# Patient Record
Sex: MALE | Race: OTHER | NOT HISPANIC OR LATINO | URBAN - METROPOLITAN AREA
[De-identification: names, ages, dates, MRNs, and addresses within clinical notes are randomized per-mention and may not be internally consistent; named-entity substitution may affect disease eponyms.]

---

## 2019-11-19 ENCOUNTER — EMERGENCY (EMERGENCY)
Facility: HOSPITAL | Age: 29
LOS: 1 days | Discharge: HOME | End: 2019-11-19
Admitting: EMERGENCY MEDICINE
Payer: OTHER MISCELLANEOUS

## 2019-11-19 VITALS
HEIGHT: 67 IN | OXYGEN SATURATION: 97 % | SYSTOLIC BLOOD PRESSURE: 124 MMHG | WEIGHT: 214.95 LBS | DIASTOLIC BLOOD PRESSURE: 84 MMHG | HEART RATE: 89 BPM | RESPIRATION RATE: 17 BRPM | TEMPERATURE: 99 F

## 2019-11-19 PROCEDURE — 99283 EMERGENCY DEPT VISIT LOW MDM: CPT

## 2019-11-19 NOTE — ED PROVIDER NOTE - PATIENT PORTAL LINK FT
You can access the FollowMyHealth Patient Portal offered by [facility] by registering at the following website: [email or web address]. By joining [facility]’s FollowMyHealth portal, you will also be able to view your health information using other applications (apps) compatible with our system.

## 2019-11-19 NOTE — ED PROVIDER NOTE - CLINICAL SUMMARY MEDICAL DECISION MAKING FREE TEXT BOX
pt. here to make sure no FB in lt eye. pt. asymptomatic, exam unremarkable, no Fb, no corneal abrasion. pt. reassured, stable for d/c, eye clinic f/u as needed. see progress note

## 2019-11-19 NOTE — ED PROVIDER NOTE - NSFOLLOWUPCLINICS_GEN_ALL_ED_FT
[facility], [place] - Ophthalmology Clinic  Ophthalmology  [address], [address]  [place], [place] [address]  Phone: [phone]  Fax:   Follow Up Time:

## 2019-11-19 NOTE — ED PROVIDER NOTE - CHPI ED SYMPTOMS NEG
no photophobia/no purulent drainage/no pain/no itching/no eye lid swelling/no blurred vision/no double vision/no foreign body/no discharge/no drainage

## 2019-11-19 NOTE — ED PROVIDER NOTE - PROGRESS NOTE DETAILS
pt. eye anesthestized with tetracaine, eye lid inverted, no FB. fluorescein stain no abrasion , tolerated well.

## 2019-11-19 NOTE — ED ADULT TRIAGE NOTE - CHIEF COMPLAINT QUOTE
working with glass and concerned that broken glass got into his left eye. no redness no drainage no change in vision. unk tdap

## 2019-11-19 NOTE — ED PROVIDER NOTE - OBJECTIVE STATEMENT
Pt. with no PMH, works as a , small part of glass cup broke shattered into small pieces, thinks piece went into his lt eye. rinsed to eye at work. otherwise denies any FB sensation , no tearing, no eye pain , no vision changes. UTD with tdap.

## 2019-11-24 DIAGNOSIS — H53.10 UNSPECIFIED SUBJECTIVE VISUAL DISTURBANCES: ICD-10-CM

## 2019-11-24 DIAGNOSIS — H57.12 OCULAR PAIN, LEFT EYE: ICD-10-CM
